# Patient Record
Sex: MALE | Race: WHITE | NOT HISPANIC OR LATINO | Employment: FULL TIME | URBAN - METROPOLITAN AREA
[De-identification: names, ages, dates, MRNs, and addresses within clinical notes are randomized per-mention and may not be internally consistent; named-entity substitution may affect disease eponyms.]

---

## 2023-12-03 ENCOUNTER — OFFICE VISIT (OUTPATIENT)
Dept: URGENT CARE | Facility: PHYSICIAN GROUP | Age: 63
End: 2023-12-03
Payer: COMMERCIAL

## 2023-12-03 ENCOUNTER — HOSPITAL ENCOUNTER (OUTPATIENT)
Dept: RADIOLOGY | Facility: MEDICAL CENTER | Age: 63
End: 2023-12-03
Payer: COMMERCIAL

## 2023-12-03 VITALS
HEART RATE: 88 BPM | DIASTOLIC BLOOD PRESSURE: 58 MMHG | RESPIRATION RATE: 17 BRPM | OXYGEN SATURATION: 93 % | HEIGHT: 66 IN | SYSTOLIC BLOOD PRESSURE: 108 MMHG | BODY MASS INDEX: 27.16 KG/M2 | TEMPERATURE: 100.6 F | WEIGHT: 169 LBS

## 2023-12-03 DIAGNOSIS — R50.9 FEVER AND CHILLS: ICD-10-CM

## 2023-12-03 DIAGNOSIS — R09.89 RESPIRATORY CRACKLES: ICD-10-CM

## 2023-12-03 DIAGNOSIS — J18.9 COMMUNITY ACQUIRED PNEUMONIA OF RIGHT MIDDLE LOBE OF LUNG: ICD-10-CM

## 2023-12-03 DIAGNOSIS — R06.2 WHEEZING: ICD-10-CM

## 2023-12-03 DIAGNOSIS — R05.1 ACUTE COUGH: ICD-10-CM

## 2023-12-03 PROCEDURE — 99203 OFFICE O/P NEW LOW 30 MIN: CPT

## 2023-12-03 PROCEDURE — 3078F DIAST BP <80 MM HG: CPT

## 2023-12-03 PROCEDURE — 3074F SYST BP LT 130 MM HG: CPT

## 2023-12-03 PROCEDURE — 71046 X-RAY EXAM CHEST 2 VIEWS: CPT

## 2023-12-03 RX ORDER — AMOXICILLIN 500 MG/1
1000 CAPSULE ORAL 3 TIMES DAILY
Qty: 30 CAPSULE | Refills: 0 | Status: SHIPPED | OUTPATIENT
Start: 2023-12-03 | End: 2023-12-08

## 2023-12-03 NOTE — LETTER
December 3, 2023         Patient: Fredy Ellison .   YOB: 1960   Date of Visit: 12/3/2023           To Whom it May Concern:    Fredy Ellison was seen in my clinic on 12/3/2023. Please excuse any absences this week due to an acute illness.     If you have any questions or concerns, please don't hesitate to call.        Sincerely,           ELAN Jenkins.  Electronically Signed

## 2023-12-03 NOTE — PROGRESS NOTES
"Subjective     Fredy Ellison Jr. is a 63 y.o. male who presents with Fever (Over a week, Cough, difficulty sleeping )            HPI Patient states that just over a week ago her started to have a cough.   It has gotten worse   He has not had any nasal congestion or sore throat.  He has had fevers and chills all week intermittently  He is a current smoker, but has not been smoking due to his current symptoms.   He has been using OTC medication the past week with no improvements.      ROS Same as above      No Known Allergies    Current Outpatient Medications   Medication Sig    amoxicillin (AMOXIL) 500 MG Cap Take 2 Capsules by mouth 3 times a day for 5 days.    aspirin (ASA) 325 MG TABS Take 325 mg by mouth every 6 hours as needed.    oxycodone-acetaminophen (PERCOCET) 5-325 MG TABS Take 1-2 Tabs by mouth every four hours as needed. (Patient not taking: Reported on 12/3/2023)        Past Medical History:   Diagnosis Date    Bronchitis     Dental disorder     cavities    Pneumonia         Social History     Socioeconomic History    Marital status: Single   Tobacco Use    Smoking status: Every Day     Current packs/day: 1.00     Average packs/day: 1 pack/day for 20.0 years (20.0 ttl pk-yrs)     Types: Cigarettes   Vaping Use    Vaping Use: Never used   Substance and Sexual Activity    Alcohol use: No    Drug use: No              Objective     /58   Pulse 88   Temp (!) 38.1 °C (100.6 °F) (Temporal)   Resp 17   Ht 1.676 m (5' 6\")   Wt 76.7 kg (169 lb)   SpO2 93%   BMI 27.28 kg/m²      Physical Exam  Vitals reviewed.   Constitutional:       Appearance: Normal appearance. He is not toxic-appearing.   HENT:      Head: Normocephalic and atraumatic.      Nose: Congestion present.   Eyes:      Conjunctiva/sclera: Conjunctivae normal.   Cardiovascular:      Rate and Rhythm: Normal rate.   Pulmonary:      Effort: Pulmonary effort is normal. No respiratory distress.      Breath sounds: Examination of the " right-upper field reveals wheezing. Examination of the left-upper field reveals wheezing. Examination of the right-middle field reveals decreased breath sounds and rales. Decreased breath sounds, wheezing and rales present. No rhonchi.   Musculoskeletal:         General: Normal range of motion.      Cervical back: Normal range of motion.   Lymphadenopathy:      Head:      Right side of head: No submental, submandibular, tonsillar, preauricular or posterior auricular adenopathy.      Left side of head: No submental, submandibular, tonsillar, preauricular or posterior auricular adenopathy.      Cervical: No cervical adenopathy.      Right cervical: No superficial cervical adenopathy.     Left cervical: No superficial cervical adenopathy.   Skin:     General: Skin is warm and dry.      Capillary Refill: Capillary refill takes less than 2 seconds.   Neurological:      Mental Status: He is alert and oriented to person, place, and time.         Assessment & Plan      Patient comes in after a  week of continued cough fevers and chills. He has been using OTC medication but is not getting better.     On exam there is bilateral wheezing in upper lung fields with rales and decreased air movement in the right middle lobe. There is no cervical adenopathy. There is no nasal congestion.   Chest xray in clinic today positive for right middle lobe pneumonia. Imagining and results reviewed with patient in clinic.  Due to Xray showing a cavitary component to the consolidation and patient hx of current tobacco use, advised to follow up with his PCP to ensure resolution or need for further management. Patient verbalized understanding.  Antibiotics called into pharmacy.  Discussed with patient continued use of OTC Tylenol/ibuprofen for symptom management. Differential diagnosis, natural history, supportive care, and indications for immediate follow-up were discussed.  Patient verbalized understanding and agreement with plan of care.   Work note provided.        1. Community acquired pneumonia of right middle lobe of lung  amoxicillin (AMOXIL) 500 MG Cap      2. Acute cough  DX-CHEST-2 VIEWS    amoxicillin (AMOXIL) 500 MG Cap      3. Fever and chills  DX-CHEST-2 VIEWS    amoxicillin (AMOXIL) 500 MG Cap      4. Respiratory crackles  DX-CHEST-2 VIEWS    amoxicillin (AMOXIL) 500 MG Cap      5. Wheezing  DX-CHEST-2 VIEWS    amoxicillin (AMOXIL) 500 MG Cap

## 2023-12-12 ENCOUNTER — HOSPITAL ENCOUNTER (OUTPATIENT)
Dept: RADIOLOGY | Facility: MEDICAL CENTER | Age: 63
End: 2023-12-12
Payer: COMMERCIAL

## 2023-12-12 ENCOUNTER — HOSPITAL ENCOUNTER (OUTPATIENT)
Dept: LAB | Facility: MEDICAL CENTER | Age: 63
End: 2023-12-12
Payer: COMMERCIAL

## 2023-12-12 ENCOUNTER — OFFICE VISIT (OUTPATIENT)
Dept: URGENT CARE | Facility: PHYSICIAN GROUP | Age: 63
End: 2023-12-12
Payer: COMMERCIAL

## 2023-12-12 VITALS
HEIGHT: 66 IN | WEIGHT: 169 LBS | HEART RATE: 93 BPM | SYSTOLIC BLOOD PRESSURE: 120 MMHG | TEMPERATURE: 97.8 F | OXYGEN SATURATION: 98 % | RESPIRATION RATE: 20 BRPM | DIASTOLIC BLOOD PRESSURE: 86 MMHG | BODY MASS INDEX: 27.16 KG/M2

## 2023-12-12 DIAGNOSIS — R05.1 ACUTE COUGH: ICD-10-CM

## 2023-12-12 LAB
ALBUMIN SERPL BCP-MCNC: 3.2 G/DL (ref 3.2–4.9)
ALBUMIN/GLOB SERPL: 0.8 G/DL
ALP SERPL-CCNC: 123 U/L (ref 30–99)
ALT SERPL-CCNC: 28 U/L (ref 2–50)
ANION GAP SERPL CALC-SCNC: 11 MMOL/L (ref 7–16)
AST SERPL-CCNC: 27 U/L (ref 12–45)
BASOPHILS # BLD AUTO: 0.8 % (ref 0–1.8)
BASOPHILS # BLD: 0.11 K/UL (ref 0–0.12)
BILIRUB SERPL-MCNC: 0.4 MG/DL (ref 0.1–1.5)
BUN SERPL-MCNC: 12 MG/DL (ref 8–22)
CALCIUM ALBUM COR SERPL-MCNC: 9.9 MG/DL (ref 8.5–10.5)
CALCIUM SERPL-MCNC: 9.3 MG/DL (ref 8.5–10.5)
CHLORIDE SERPL-SCNC: 98 MMOL/L (ref 96–112)
CO2 SERPL-SCNC: 25 MMOL/L (ref 20–33)
CREAT SERPL-MCNC: 0.63 MG/DL (ref 0.5–1.4)
EOSINOPHIL # BLD AUTO: 0.02 K/UL (ref 0–0.51)
EOSINOPHIL NFR BLD: 0.2 % (ref 0–6.9)
ERYTHROCYTE [DISTWIDTH] IN BLOOD BY AUTOMATED COUNT: 44.3 FL (ref 35.9–50)
GFR SERPLBLD CREATININE-BSD FMLA CKD-EPI: 108 ML/MIN/1.73 M 2
GLOBULIN SER CALC-MCNC: 4.2 G/DL (ref 1.9–3.5)
GLUCOSE SERPL-MCNC: 90 MG/DL (ref 65–99)
HCT VFR BLD AUTO: 36.2 % (ref 42–52)
HGB BLD-MCNC: 12.2 G/DL (ref 14–18)
IMM GRANULOCYTES # BLD AUTO: 0.17 K/UL (ref 0–0.11)
IMM GRANULOCYTES NFR BLD AUTO: 1.3 % (ref 0–0.9)
LYMPHOCYTES # BLD AUTO: 2.38 K/UL (ref 1–4.8)
LYMPHOCYTES NFR BLD: 17.9 % (ref 22–41)
MCH RBC QN AUTO: 30 PG (ref 27–33)
MCHC RBC AUTO-ENTMCNC: 33.7 G/DL (ref 32.3–36.5)
MCV RBC AUTO: 89.2 FL (ref 81.4–97.8)
MONOCYTES # BLD AUTO: 0.89 K/UL (ref 0–0.85)
MONOCYTES NFR BLD AUTO: 6.7 % (ref 0–13.4)
NEUTROPHILS # BLD AUTO: 9.76 K/UL (ref 1.82–7.42)
NEUTROPHILS NFR BLD: 73.1 % (ref 44–72)
NRBC # BLD AUTO: 0 K/UL
NRBC BLD-RTO: 0 /100 WBC (ref 0–0.2)
NT-PROBNP SERPL IA-MCNC: 460 PG/ML (ref 0–125)
PLATELET # BLD AUTO: 723 K/UL (ref 164–446)
PMV BLD AUTO: 8.5 FL (ref 9–12.9)
POTASSIUM SERPL-SCNC: 5 MMOL/L (ref 3.6–5.5)
PROT SERPL-MCNC: 7.4 G/DL (ref 6–8.2)
RBC # BLD AUTO: 4.06 M/UL (ref 4.7–6.1)
SODIUM SERPL-SCNC: 134 MMOL/L (ref 135–145)
WBC # BLD AUTO: 13.3 K/UL (ref 4.8–10.8)

## 2023-12-12 PROCEDURE — 3074F SYST BP LT 130 MM HG: CPT

## 2023-12-12 PROCEDURE — 80053 COMPREHEN METABOLIC PANEL: CPT

## 2023-12-12 PROCEDURE — 85025 COMPLETE CBC W/AUTO DIFF WBC: CPT

## 2023-12-12 PROCEDURE — 36415 COLL VENOUS BLD VENIPUNCTURE: CPT

## 2023-12-12 PROCEDURE — 99214 OFFICE O/P EST MOD 30 MIN: CPT

## 2023-12-12 PROCEDURE — 3079F DIAST BP 80-89 MM HG: CPT

## 2023-12-12 PROCEDURE — 71046 X-RAY EXAM CHEST 2 VIEWS: CPT

## 2023-12-12 PROCEDURE — 83880 ASSAY OF NATRIURETIC PEPTIDE: CPT

## 2023-12-12 RX ORDER — AMOXICILLIN AND CLAVULANATE POTASSIUM 875; 125 MG/1; MG/1
1 TABLET, FILM COATED ORAL 2 TIMES DAILY
Qty: 10 TABLET | Refills: 0 | Status: SHIPPED | OUTPATIENT
Start: 2023-12-12 | End: 2023-12-13

## 2023-12-12 RX ORDER — DOXYCYCLINE HYCLATE 100 MG
100 TABLET ORAL 2 TIMES DAILY
Qty: 10 TABLET | Refills: 0 | Status: SHIPPED | OUTPATIENT
Start: 2023-12-12 | End: 2023-12-13

## 2023-12-12 NOTE — PROGRESS NOTES
"Chief Complaint   Patient presents with    Pneumonia     Pt came in for work letter         Subjective:   HISTORY OF PRESENT ILLNESS: Fredy Ellison Jr. is a 63 y.o. male who presents for ongoing cough. He was seen here in  8 days ago with 1 week of ocugh and chills, he was treated with Amoxicillin for a right middle lobe PNA.  There was some question if it could be a neoplasm. He is a 40-year pack smoker, down to 1 pack per month due ot his current cough.  His cough is ongoing for over 15 days now, he still reports significant fatigue. His cough is worse  when he lays flat, states when he sits up he doesn't cough at all.     Patient denies any recent fevers. Denies acid reflux.  He reports eating makes his coughing worse and then he dry heaves and almost vomits, so he hasn't been eating well. He deneis SOB or chest pain.  Has some mild lower extremtiy edema.  Reports no PMH.      Medications, Allergies, current problem list, Social and Family history reviewed today in Epic.     Objective:     /86   Pulse 93   Temp 36.6 °C (97.8 °F) (Temporal)   Resp 20   Ht 1.676 m (5' 6\")   Wt 76.7 kg (169 lb)   SpO2 98%     Physical Exam  Vitals reviewed.   Constitutional:       Appearance: Normal appearance.   HENT:      Mouth/Throat:      Mouth: Mucous membranes are moist.   Cardiovascular:      Rate and Rhythm: Normal rate and regular rhythm.      Heart sounds: Normal heart sounds, S1 normal and S2 normal.      Comments: Mild bilateral LE edema. Non pitting   Pulmonary:      Effort: Pulmonary effort is normal. No respiratory distress.      Breath sounds: Normal breath sounds. No wheezing or rhonchi.   Abdominal:      General: Abdomen is flat.   Musculoskeletal:      Right lower le+ Edema present.      Left lower le+ Edema present.   Skin:     General: Skin is warm and dry.   Neurological:      Mental Status: He is alert and oriented to person, place, and time.   Psychiatric:         Mood and Affect: " Mood normal.          Assessment/Plan:     Diagnosis and associated orders    I personally reviewed prior external notes and test results pertinent to today's visit.     1. Acute cough  DX-CHEST-2 VIEWS    Referral back to PCP    amoxicillin-clavulanate (AUGMENTIN) 875-125 MG Tab    doxycycline (VIBRAMYCIN) 100 MG Tab    CBC WITH DIFFERENTIAL    Comp Metabolic Panel    proBrain Natriuretic Peptide, NT        Today's radiology imaging personally reviewed by me today on day of visit and Radiology readings reviewed and discussed w/ patient today.     RADIOLOGY RESULTS   DX-CHEST-2 VIEWS    Result Date: 12/12/2023 12/12/2023 2:57 PM HISTORY/REASON FOR EXAM: Cough.  Follow-up TECHNIQUE/EXAM DESCRIPTION AND NUMBER OF VIEWS: Two views of the chest. COMPARISON:  12/3/2023. FINDINGS: Right suprahilar masslike consolidation with central lucency shows some improvement. This is in the right upper lobe anterior segment. This measures 70 from 94 mm short axis. No new areas of consolidation are seen. Left lung is clear. Cardiomediastinal contours are normal. No pleural space process is evident.     Improving right upper lobe consolidation with associated cavitation. This is concerning for necrotizing pneumonia versus atypical pneumonia such as tuberculosis or fungal disease. Follow-up to resolution is required as the differential includes bronchogenic carcinoma                 IMPRESSION:  I had a long discussion with this pt, he feels like he is improving but does not feel well enough to go back to work.  He has not had any weight loss or night sweats.  He does not have any SOB or chest pain.  No recent fevers.  His chest xray is very concerning although it looks to be improving somewhat, he did NOT have good primary follow up. I discussed him presenting to the emergency department tonight, he states that he really would rather not go to ER,   I was able to schedule him with a new provider for tomorrow at 1500.  His chest xrays  need to be followed closely.  I have started him on Augmentin and doxy.  Instructed him that at anytime his symptoms become worse or develops new onset fevers he needs to go. He is very well appearing, afebrile, lung sounds clear, no SOB. I think he can FU tomorrow.         Differential diagnosis discussed. Pt was Educated on red flag symptoms. Pt has been Instructed to return to Urgent Care or nearest Emergency Department if symptoms fail to improve, for any change in condition, further concerns, or new concerning symptoms. Patient states understanding of the plan of care and discharge instructions.  They are discharged in stable condition.         Please note that this dictation was created using voice recognition software. I have made a reasonable attempt to correct obvious errors, but I expect that there are errors of grammar and possibly content that I did not discover before finalizing the note.    This note was electronically signed by YESENIA Garcia

## 2023-12-13 ENCOUNTER — OFFICE VISIT (OUTPATIENT)
Dept: MEDICAL GROUP | Facility: MEDICAL CENTER | Age: 63
End: 2023-12-13
Payer: COMMERCIAL

## 2023-12-13 VITALS
SYSTOLIC BLOOD PRESSURE: 104 MMHG | OXYGEN SATURATION: 98 % | WEIGHT: 163 LBS | HEIGHT: 66 IN | HEART RATE: 77 BPM | BODY MASS INDEX: 26.2 KG/M2 | DIASTOLIC BLOOD PRESSURE: 52 MMHG | TEMPERATURE: 97.7 F

## 2023-12-13 DIAGNOSIS — Z72.0 TOBACCO USE: ICD-10-CM

## 2023-12-13 DIAGNOSIS — J18.9 PNEUMONIA OF RIGHT MIDDLE LOBE DUE TO INFECTIOUS ORGANISM: ICD-10-CM

## 2023-12-13 DIAGNOSIS — K21.9 GASTROESOPHAGEAL REFLUX DISEASE WITHOUT ESOPHAGITIS: ICD-10-CM

## 2023-12-13 PROCEDURE — 3078F DIAST BP <80 MM HG: CPT | Performed by: PHYSICIAN ASSISTANT

## 2023-12-13 PROCEDURE — 3074F SYST BP LT 130 MM HG: CPT | Performed by: PHYSICIAN ASSISTANT

## 2023-12-13 PROCEDURE — 99214 OFFICE O/P EST MOD 30 MIN: CPT | Performed by: PHYSICIAN ASSISTANT

## 2023-12-13 ASSESSMENT — FIBROSIS 4 INDEX: FIB4 SCORE: 0.43

## 2023-12-13 ASSESSMENT — PATIENT HEALTH QUESTIONNAIRE - PHQ9: CLINICAL INTERPRETATION OF PHQ2 SCORE: 0

## 2023-12-13 NOTE — LETTER
December 13, 2023         Patient: Fredy Ellison .   YOB: 1960   Date of Visit: 12/13/2023           To Whom it May Concern:    Fredy Ellison was seen in my clinic on 12/13/2023. He may return to work on 12/19/2023.    If you have any questions or concerns, please don't hesitate to call.        Sincerely,           Silas Callahan P.A.-C.  Electronically Signed

## 2023-12-13 NOTE — ASSESSMENT & PLAN NOTE
This is a pleasant 63-year-old male here today to establish care.  He is here today to follow-up on his recent diagnosis of pneumonia.  Has had 2 chest x-rays that were concerning.  Last chest x-ray showed the following:    Improving right upper lobe consolidation with associated cavitation. This is concerning for necrotizing pneumonia versus atypical pneumonia such as tuberculosis or fungal disease. Follow-up to resolution is required as the differential includes bronchogenic carcinoma.    He was seen on 2 different occasions.  Yesterday was his second.  He was started on doxycycline.  Feels much better today.     One of his concerns that possibly he aspirated secondary to reflux.  If he eats prior to bedtime and sleeps on a certain side he will have reflux symptoms and cough all night.

## 2023-12-13 NOTE — PROGRESS NOTES
"Subjective:   Fredy Ellison Jr. is a 63 y.o. male here today for pneumonia and to establish care.    Pneumonia due to infectious organism  This is a pleasant 63-year-old male here today to establish care.  He is here today to follow-up on his recent diagnosis of pneumonia.  Has had 2 chest x-rays that were concerning.  Last chest x-ray showed the following:    Improving right upper lobe consolidation with associated cavitation. This is concerning for necrotizing pneumonia versus atypical pneumonia such as tuberculosis or fungal disease. Follow-up to resolution is required as the differential includes bronchogenic carcinoma.    He was seen on 2 different occasions.  Yesterday was his second.  He was started on doxycycline.  Feels much better today.     One of his concerns that possibly he aspirated secondary to reflux.  If he eats prior to bedtime and sleeps on a certain side he will have reflux symptoms and cough all night.    Tobacco use  Currently smoking about a pack a month.  Has an approximate 20-pack-year history.       Current medicines (including changes today)  Current Outpatient Medications   Medication Sig Dispense Refill    aspirin (ASA) 325 MG TABS Take 325 mg by mouth every 6 hours as needed.       No current facility-administered medications for this visit.     He  has a past medical history of Bronchitis, Dental disorder, and Pneumonia.    Social History and Family History were reviewed and updated.    ROS   No chest pain, no shortness of breath, no abdominal pain and all other systems were reviewed and are negative.       Objective:     /52 (BP Location: Right arm, Patient Position: Sitting, BP Cuff Size: Adult)   Pulse 77   Temp 36.5 °C (97.7 °F) (Temporal)   Ht 1.676 m (5' 6\")   Wt 73.9 kg (163 lb)   SpO2 98%  Body mass index is 26.31 kg/m².   Physical Exam:  Constitutional: Alert, no distress.  Skin: Warm, dry, good turgor, no rashes in visible areas.  Eye: Equal, round and " reactive, conjunctiva clear, lids normal.  ENMT: Lips without lesions, good dentition, oropharynx clear.  Neck: Trachea midline, no masses.   Respiratory: Unlabored respiratory effort.  Psych: Alert and oriented x3, normal affect and mood.        Assessment and Plan:   The following treatment plan was discussed    1. Pneumonia of right middle lobe due to infectious organism  Acute, new onset condition.  Reviewed x-rays.  He will follow-up in 1 month for repeat chest x-ray.  Finish antibiotic, doxycycline.  Concerned about the lung images.  - DX-CHEST-2 VIEWS; Future    2. Gastroesophageal reflux disease without esophagitis  Chronic, intermittent condition.  Discussed avoidance of food prior to bedtime.  He is well aware and plans to make this a concerted effort.    3. Tobacco use  Chronic use.  Currently infrequent smoking.     Discussed need for preventative measures such as colonoscopy and labs but he declined.  Declined all vaccinations as well.    Followup: Return in about 6 months (around 6/13/2024), or if symptoms worsen or fail to improve, for Annual physical.    Please note that this dictation was created using voice recognition software. I have made every reasonable attempt to correct obvious errors, but I expect that there are errors of grammar and possibly content that I did not discover before finalizing the note.

## 2023-12-14 ENCOUNTER — APPOINTMENT (OUTPATIENT)
Dept: MEDICAL GROUP | Facility: PHYSICIAN GROUP | Age: 63
End: 2023-12-14
Payer: COMMERCIAL

## 2024-01-15 ENCOUNTER — HOSPITAL ENCOUNTER (OUTPATIENT)
Dept: RADIOLOGY | Facility: MEDICAL CENTER | Age: 64
End: 2024-01-15
Attending: PHYSICIAN ASSISTANT
Payer: COMMERCIAL

## 2024-01-15 DIAGNOSIS — J18.9 PNEUMONIA OF RIGHT MIDDLE LOBE DUE TO INFECTIOUS ORGANISM: ICD-10-CM

## 2024-01-15 PROCEDURE — 71046 X-RAY EXAM CHEST 2 VIEWS: CPT

## 2024-01-17 ENCOUNTER — OFFICE VISIT (OUTPATIENT)
Dept: MEDICAL GROUP | Facility: MEDICAL CENTER | Age: 64
End: 2024-01-17
Payer: COMMERCIAL

## 2024-01-17 VITALS
DIASTOLIC BLOOD PRESSURE: 42 MMHG | HEIGHT: 66 IN | OXYGEN SATURATION: 97 % | WEIGHT: 163.2 LBS | TEMPERATURE: 99.2 F | BODY MASS INDEX: 26.23 KG/M2 | SYSTOLIC BLOOD PRESSURE: 110 MMHG | HEART RATE: 99 BPM

## 2024-01-17 DIAGNOSIS — K21.9 GASTROESOPHAGEAL REFLUX DISEASE WITHOUT ESOPHAGITIS: ICD-10-CM

## 2024-01-17 DIAGNOSIS — R05.1 ACUTE COUGH: ICD-10-CM

## 2024-01-17 DIAGNOSIS — R93.89 ABNORMAL CHEST X-RAY: ICD-10-CM

## 2024-01-17 PROCEDURE — 3078F DIAST BP <80 MM HG: CPT | Performed by: PHYSICIAN ASSISTANT

## 2024-01-17 PROCEDURE — 3074F SYST BP LT 130 MM HG: CPT | Performed by: PHYSICIAN ASSISTANT

## 2024-01-17 PROCEDURE — 99214 OFFICE O/P EST MOD 30 MIN: CPT | Performed by: PHYSICIAN ASSISTANT

## 2024-01-17 RX ORDER — DOXYCYCLINE HYCLATE 100 MG
100 TABLET ORAL 2 TIMES DAILY
Qty: 14 TABLET | Refills: 0 | Status: SHIPPED | OUTPATIENT
Start: 2024-01-17 | End: 2024-01-24

## 2024-01-17 RX ORDER — OMEPRAZOLE 20 MG/1
20 CAPSULE, DELAYED RELEASE ORAL DAILY
Qty: 30 CAPSULE | Refills: 2 | Status: SHIPPED | OUTPATIENT
Start: 2024-01-17 | End: 2024-02-16

## 2024-01-17 ASSESSMENT — PATIENT HEALTH QUESTIONNAIRE - PHQ9: CLINICAL INTERPRETATION OF PHQ2 SCORE: 0

## 2024-01-17 ASSESSMENT — FIBROSIS 4 INDEX: FIB4 SCORE: 0.43

## 2024-01-17 NOTE — PROGRESS NOTES
"Subjective:   Fredy Ellison Jr. is a 63 y.o. male here today for acute cough status post pneumonia.      Acute cough  This is a pleasant 63-year-old male complains of continued cough after pneumonia.  States that it feels like the right side of his chest has cleared up and on his left side is bothering him.  He has a midsternal chest discomfort at times.  Denies any fevers.  Recent x-ray follow-up from diagnosis of pneumonia showed the followin.  Interval improvement in consolidation within the right midlung. There is some residual parenchymal opacity in that area which has central lucency. This may represent abscess versus pneumatocele or bullous change.     2.  New small right pleural effusion with hazy parenchymal opacity within the right lung base possibly related to presence of pleural effusion.    He has not been smoking for well over a month.  Does have a history of reflux which he is currently not on medication.  He believes his reflux is well-controlled.  Symptoms typically will occur at nighttime.  Does have a cough at night currently.  Manages to sleep.       Current medicines (including changes today)  Current Outpatient Medications   Medication Sig Dispense Refill    omeprazole (PRILOSEC) 20 MG delayed-release capsule Take 1 Capsule by mouth every day for 30 days. 1/2 hour prior to same meal. 30 Capsule 2    doxycycline (VIBRAMYCIN) 100 MG Tab Take 1 Tablet by mouth 2 times a day for 7 days. 14 Tablet 0     No current facility-administered medications for this visit.     He  has a past medical history of Bronchitis, Dental disorder, and Pneumonia.    Social History and Family History were reviewed and updated.    ROS   No abdominal pain and all other systems were reviewed and are negative.       Objective:     /42 (BP Location: Left arm, Patient Position: Sitting, BP Cuff Size: Adult)   Pulse 99   Temp 37.3 °C (99.2 °F) (Temporal)   Ht 1.676 m (5' 6\")   Wt 74 kg (163 lb 3.2 oz)  "  SpO2 97%  Body mass index is 26.34 kg/m².   Physical Exam:  Constitutional: Alert, no distress.  Skin: Warm, dry, good turgor, no rashes in visible areas.  Eye: Equal, round and reactive, conjunctiva clear, lids normal.  ENMT: Lips without lesions, good dentition, oropharynx clear.  Neck: Trachea midline, no masses.   Respiratory: Unlabored respiratory effort, lungs clear to auscultation, no wheezes, no ronchi.  Psych: Alert and oriented x3, normal affect and mood.        Assessment and Plan:   The following treatment plan was discussed    1. Acute cough  Acute, new onset condition.  Status post pneumonia.  Given recent x-ray results will continue an antibiotic course of doxycycline.  Repeat chest x-ray in 3 to 4 weeks.  Also ordered CT scan of his lungs.  May perform after the chest x-ray if it remains abnormal.  Also will treat with omeprazole given his history of reflux.  Cough may be associated with the reflux.  - CT-CHEST (THORAX) W/O; Future  - DX-CHEST-2 VIEWS; Future  - doxycycline (VIBRAMYCIN) 100 MG Tab; Take 1 Tablet by mouth 2 times a day for 7 days.  Dispense: 14 Tablet; Refill: 0  - Referral to Pulmonary and Sleep Medicine    2. Abnormal chest x-ray  Reviewed his chest x-ray.  Referred to pulmonology.  Another round of antibiotics and chest x-ray in 3 to 4 weeks.  CT follow-up if needed.  Follow-up at the ER with any worsening symptoms such as shortness of breath or chest pain.  - CT-CHEST (THORAX) W/O; Future  - DX-CHEST-2 VIEWS; Future  - doxycycline (VIBRAMYCIN) 100 MG Tab; Take 1 Tablet by mouth 2 times a day for 7 days.  Dispense: 14 Tablet; Refill: 0  - Referral to Pulmonary and Sleep Medicine    3. Gastroesophageal reflux disease without esophagitis  Chronic condition with possible exacerbation.  Will place him on a trial of omeprazole to help with the coughing.  May help.    - omeprazole (PRILOSEC) 20 MG delayed-release capsule; Take 1 Capsule by mouth every day for 30 days. 1/2 hour prior to  same meal.  Dispense: 30 Capsule; Refill: 2         Followup: Return if symptoms worsen or fail to improve.    Please note that this dictation was created using voice recognition software. I have made every reasonable attempt to correct obvious errors, but I expect that there are errors of grammar and possibly content that I did not discover before finalizing the note.

## 2024-01-17 NOTE — ASSESSMENT & PLAN NOTE
This is a pleasant 63-year-old male complains of continued cough after pneumonia.  States that it feels like the right side of his chest has cleared up and on his left side is bothering him.  He has a midsternal chest discomfort at times.  Denies any fevers.  Recent x-ray follow-up from diagnosis of pneumonia showed the followin.  Interval improvement in consolidation within the right midlung. There is some residual parenchymal opacity in that area which has central lucency. This may represent abscess versus pneumatocele or bullous change.     2.  New small right pleural effusion with hazy parenchymal opacity within the right lung base possibly related to presence of pleural effusion.    He has not been smoking for well over a month.  Does have a history of reflux which he is currently not on medication.  He believes his reflux is well-controlled.  Symptoms typically will occur at nighttime.  Does have a cough at night currently.  Manages to sleep.

## 2024-02-05 ENCOUNTER — APPOINTMENT (OUTPATIENT)
Dept: RADIOLOGY | Facility: MEDICAL CENTER | Age: 64
End: 2024-02-05
Attending: PHYSICIAN ASSISTANT
Payer: COMMERCIAL

## 2024-02-08 ENCOUNTER — TELEPHONE (OUTPATIENT)
Dept: HEALTH INFORMATION MANAGEMENT | Facility: OTHER | Age: 64
End: 2024-02-08

## 2024-06-11 ENCOUNTER — APPOINTMENT (OUTPATIENT)
Dept: MEDICAL GROUP | Facility: MEDICAL CENTER | Age: 64
End: 2024-06-11
Payer: COMMERCIAL